# Patient Record
Sex: FEMALE | Race: WHITE | URBAN - METROPOLITAN AREA
[De-identification: names, ages, dates, MRNs, and addresses within clinical notes are randomized per-mention and may not be internally consistent; named-entity substitution may affect disease eponyms.]

---

## 2017-09-29 ENCOUNTER — OFFICE VISIT (OUTPATIENT)
Dept: INTERNAL MEDICINE CLINIC | Age: 22
End: 2017-09-29

## 2017-09-29 VITALS
WEIGHT: 165 LBS | OXYGEN SATURATION: 100 % | HEART RATE: 94 BPM | BODY MASS INDEX: 27.49 KG/M2 | SYSTOLIC BLOOD PRESSURE: 128 MMHG | DIASTOLIC BLOOD PRESSURE: 86 MMHG | TEMPERATURE: 98.2 F | RESPIRATION RATE: 18 BRPM | HEIGHT: 65 IN

## 2017-09-29 DIAGNOSIS — M79.604 PAIN OF RIGHT LOWER EXTREMITY: Primary | ICD-10-CM

## 2017-09-29 NOTE — MR AVS SNAPSHOT
Visit Information Date & Time Provider Department Dept. Phone Encounter #  
 9/29/2017  2:45 PM MD [facility] [phone] [id number] Follow-up Instructions Return in about 2 weeks (around 10/13/2017) for right foot pain. Follow-up and Disposition History Upcoming Health Maintenance Date Due  
 HPV AGE 9Y-34Y (1 of 3 - Female 3 Dose Series) 7/25/2005 DTaP/Tdap/Td series (1 - Tdap) 7/25/2015 PAP AKA CERVICAL CYTOLOGY 7/25/2015 Allergies as of 9/29/2017  Review Complete On: 9/29/2017 By: Kris Candelario MD  
  
 Severity Noted Reaction Type Reactions Latex Low 09/29/2017    Itching Gluten High 09/29/2017    Nausea and Vomiting Penicillins Medium 09/29/2017    Other (comments) Joint pain Current Immunizations  Never Reviewed No immunizations on file. Not reviewed this visit You Were Diagnosed With   
  
 Codes Comments Pain of right lower extremity    -  Primary ICD-10-CM: M79.604 ICD-9-CM: 729.5 Vitals BP Pulse Temp Resp Height(growth percentile) Weight(growth percentile) 128/86 (BP 1 Location: Left arm, BP Patient Position: Sitting) 94 98.2 °F (36.8 °C) (Oral) 18 5' 5\" (1.651 m) 165 lb (74.8 kg) SpO2 BMI  
  
  
  
 100% 27.46 kg/m2 Vitals History BMI and BSA Data Body Mass Index Body Surface Area  
 27.46 kg/m 2 1.85 m 2 Your Updated Medication List  
  
Notice  As of 9/29/2017  3:52 PM  
 You have not been prescribed any medications. We Performed the Following VITAMIN D, 25 HYDROXY [id number] CPT(R)] Follow-up Instructions Return in about 2 weeks (around 10/13/2017) for right foot pain. To-Do List   
 09/29/2017 Imaging:  XR FOOT RT MIN 3 V Introducing [facility] & HEALTH SERVICES!    
 Adelia Chavez introduces [employer] patient portal. Now you can access parts of your medical record, email your doctor's office, and request medication refills online. 1. In your internet browser, go to [email or web address] 2. Click on the First Time User? Click Here link in the Sign In box. You will see the New Member Sign Up page. 3. Enter your TopPatch Access Code exactly as it appears below. You will not need to use this code after youve completed the sign-up process. If you do not sign up before the expiration date, you must request a new code. · TopPatch Access Code: [email or web address] Expires: 12/28/2017  3:51 PM 
 
4. Enter the last four digits of your Social Security Number (xxxx) and Date of Birth (mm/dd/yyyy) as indicated and click Submit. You will be taken to the next sign-up page. 5. Create a TopPatch ID. This will be your TopPatch login ID and cannot be changed, so think of one that is secure and easy to remember. 6. Create a TopPatch password. You can change your password at any time. 7. Enter your Password Reset Question and Answer. This can be used at a later time if you forget your password. 8. Enter your e-mail address. You will receive e-mail notification when new information is available in [address] 9. Click Sign Up. You can now view and download portions of your medical record. 10. Click the Download Summary menu link to download a portable copy of your medical information. If you have questions, please visit the Frequently Asked Questions section of the TopPatch website. Remember, TopPatch is NOT to be used for urgent needs. For medical emergencies, dial 911. Now available from your iPhone and Android! Please provide this summary of care documentation to your next provider. If you have any questions after today's visit, please call [phone].

## 2017-09-29 NOTE — PROGRESS NOTES
Chief Complaint   Patient presents with    Foot Pain     R foot pain x 1 year     she is a 20y.o. year old female who presents for evaluation of R foot pain x 1 year. Pain Assessment Encounter      Whit Devlin  9/29/2017  Onset of Symptoms: 1 year  ________________________________________________________________________  Description: Dull to sharp    Frequency: 3-4 times a day  Pain Scale:(1-10): 3  Trauma Hx: None  Hx of similar symptoms: No:   Radiation: None  Duration:  intermittent      Progression: is unchanged  What makes it better?: rest  What makes it worse?:exercise and walking  Medications tried: ibuprofen    Reviewed and agree with Nurse Note and duplicated in this note. Reviewed PmHx, RxHx, FmHx, SocHx, AllgHx and updated and dated in the chart. No family history on file. No past medical history on file.    Social History     Social History    Marital status: UNKNOWN     Spouse name: N/A    Number of children: N/A    Years of education: N/A     Social History Main Topics    Smoking status: Not on file    Smokeless tobacco: Not on file    Alcohol use Not on file    Drug use: Not on file    Sexual activity: Not on file     Other Topics Concern    Not on file     Social History Narrative        Review of Systems - negative except as listed above      Objective:     Vitals:    09/29/17 1505   BP: 144/83   Pulse: 94   Resp: 18   Temp: 98.2 °F (36.8 °C)   TempSrc: Oral   SpO2: 100%   Weight: 165 lb (74.8 kg)   Height: 5' 5\" (1.651 m)       Physical Examination: General appearance - alert, well appearing, and in no distress  Back exam - full range of motion, no tenderness, palpable spasm or pain on motion  Neurological - alert, oriented, normal speech, no focal findings or movement disorder noted  Musculoskeletal - no joint tenderness, deformity or swelling  Extremities - right foot -pain with palpation of digits 2 and 3 metatarsal heads dorsal surface, no deformity noted, strength 5 out of 5, negative hop test negative Tinel's test  Skin - normal coloration and turgor, no rashes, no suspicious skin lesions noted    Assessment/ Plan:   Diagnoses and all orders for this visit:    1. Pain of right lower extremity  -     XR FOOT RT MIN 3 V; Future         Metatarsalgia versus Gonzalez's neuroma versus stress fracture  We will treat this is a stress fracture with a boot as patient rather have this treatment. Consider physical therapy. Return to clinic in 2 weeks for re-eval  Modify activity levels  X-ray within normal limits    I have discussed the diagnosis with the patient and the intended plan as seen in the above orders. The patient has received an after-visit summary and questions were answered concerning future plans. Medication Side Effects and Warnings were discussed with patient: yes  Patient Labs were reviewed and or requested: yes  Patient Past Records were reviewed and or requested  yes  I have discussed the diagnosis with the patient and the intended plan as seen in the above orders. The patient has received an after-visit summary and questions were answered concerning future plans. Pt agrees to call or return to clinic and/or go to closest ER with any worsening of symptoms. This may include, but not limited to increased fever (>100.4) with NSAIDS or Tylenol, increased edema, confusion, rash, worsening of presenting symptoms. 1) Remember to stay active and/or exercise regularly (I suggest 30-45 minutes daily)   2) For reliable dietary information, go to www. EATRIGHT.org. You may wish to consider seeing the nutritionist at [facility] at #[phone] or [phone], also consider the [address]   3) I routinely suggest a complete physical exam once each year (your birth month)

## 2017-09-30 LAB — 25(OH)D3+25(OH)D2 SERPL-MCNC: 15 NG/ML (ref 30–100)

## 2017-10-04 RX ORDER — ERGOCALCIFEROL 1.25 MG/1
50000 CAPSULE ORAL
Qty: 7 CAP | Refills: 0 | Status: SHIPPED | OUTPATIENT
Start: 2017-10-04 | End: 2017-11-17 | Stop reason: SDUPTHER

## 2017-10-13 ENCOUNTER — OFFICE VISIT (OUTPATIENT)
Dept: INTERNAL MEDICINE CLINIC | Age: 22
End: 2017-10-13

## 2017-10-13 VITALS
HEIGHT: 65 IN | SYSTOLIC BLOOD PRESSURE: 128 MMHG | HEART RATE: 72 BPM | TEMPERATURE: 98 F | DIASTOLIC BLOOD PRESSURE: 86 MMHG

## 2017-10-13 DIAGNOSIS — M84.374S STRESS FRACTURE, RIGHT FOOT, SEQUELA: Primary | ICD-10-CM

## 2017-10-13 NOTE — MR AVS SNAPSHOT
Visit Information Date & Time Provider Department Dept. Phone Encounter #  
 10/13/2017  2:30 PM [address] [facility] MD [facility] [phone] [id number] Follow-up Instructions Return in about 3 weeks (around 11/3/2017). Upcoming Health Maintenance Date Due  
 HPV AGE 9Y-34Y (1 of 3 - Female 3 Dose Series) 7/25/2005 PAP AKA CERVICAL CYTOLOGY 7/25/2015 DTaP/Tdap/Td series (2 - Td) 10/13/2027 Allergies as of 10/13/2017  Review Complete On: 10/13/2017 By: [address] [facility] MD Rafael  
  
 Severity Noted Reaction Type Reactions Latex Low 09/29/2017    Itching Gluten High 09/29/2017    Nausea and Vomiting Penicillins Medium 09/29/2017    Other (comments) Joint pain Current Immunizations  Never Reviewed No immunizations on file. Not reviewed this visit You Were Diagnosed With   
  
 Codes Comments Stress fracture, right foot, sequela    -  Primary ICD-10-CM: B77.361B ICD-9-CM: GMU7579 Vitals BP Pulse Temp Height(growth percentile) 128/86 (BP 1 Location: Right arm, BP Patient Position: Sitting) 72 98 °F (36.7 °C) (Oral) 5' 5\" (1.651 m) Your Updated Medication List  
  
   
This list is accurate as of: 10/13/17  2:48 PM.  Always use your most recent med list.  
  
  
  
  
 ergocalciferol 50,000 unit capsule Commonly known as:  ERGOCALCIFEROL Take 1 Cap by mouth every seven (7) days. Follow-up Instructions Return in about 3 weeks (around 11/3/2017). To-Do List   
 10/13/2017 Imaging:  NM BONE SCAN 3 PHASE Introducing [facility] & HEALTH SERVICES! Apollo Carnes introduces [employer] patient portal. Now you can access parts of your medical record, email your doctor's office, and request medication refills online. 1. In your internet browser, go to [email or web address] 2. Click on the First Time User? Click Here link in the Sign In box.  You will see the New Member Sign Up page. 3. Enter your Pay with a Tweet Access Code exactly as it appears below. You will not need to use this code after youve completed the sign-up process. If you do not sign up before the expiration date, you must request a new code. · Pay with a Tweet Access Code: [email or web address] Expires: 12/28/2017  3:51 PM 
 
4. Enter the last four digits of your Social Security Number (xxxx) and Date of Birth (mm/dd/yyyy) as indicated and click Submit. You will be taken to the next sign-up page. 5. Create a ubigratet ID. This will be your Pay with a Tweet login ID and cannot be changed, so think of one that is secure and easy to remember. 6. Create a Pay with a Tweet password. You can change your password at any time. 7. Enter your Password Reset Question and Answer. This can be used at a later time if you forget your password. 8. Enter your e-mail address. You will receive e-mail notification when new information is available in [address] 9. Click Sign Up. You can now view and download portions of your medical record. 10. Click the Download Summary menu link to download a portable copy of your medical information. If you have questions, please visit the Frequently Asked Questions section of the Pay with a Tweet website. Remember, Pay with a Tweet is NOT to be used for urgent needs. For medical emergencies, dial 911. Now available from your iPhone and Android! Please provide this summary of care documentation to your next provider. Your primary care clinician is listed as Roberto Miller. If you have any questions after today's visit, please call [phone].

## 2017-10-13 NOTE — PROGRESS NOTES
Chief Complaint   Patient presents with    Foot Pain     x 2 week     she is a 20y.o. year old female who presents for follow up of injury. Follow Up Pain Assessment Encounter      Onset of Symptoms: months  ________________________________________________________________________  Description: Pain is now 60% has slightly improved      Pain Scale:(1-10): 2  Duration: constant  What makes it better?: rest  What makes it worse?:walking  Medications tried: ibuprofen  Modalities tried:         Reviewed and agree with Nurse Note and duplicated in this note. Reviewed PmHx, RxHx, FmHx, SocHx, AllgHx and updated and dated in the chart. No family history on file. No past medical history on file.    Social History     Social History    Marital status: SINGLE     Spouse name: N/A    Number of children: N/A    Years of education: N/A     Social History Main Topics    Smoking status: Not on file    Smokeless tobacco: Not on file    Alcohol use Not on file    Drug use: Not on file    Sexual activity: Not on file     Other Topics Concern    Not on file     Social History Narrative        Review of Systems - negative except as listed above      Objective:     Vitals:    10/13/17 1436   BP: 128/86   Pulse: 72   Temp: 98 °F (36.7 °C)   TempSrc: Oral   Height: 5' 5\" (1.651 m)       Physical Examination: General appearance - alert, well appearing, and in no distress  Back exam - full range of motion, no tenderness, palpable spasm or pain on motion  Neurological - alert, oriented, normal speech, no focal findings or movement disorder noted  Musculoskeletal - no joint tenderness, deformity or swelling  Extremities - right foot -pain with palpation of digits 2 and 3 metatarsal heads dorsal surface, no deformity noted, strength 5 out of 5, negative hop test negative Tinel's test    Skin - normal coloration and turgor, no rashes, no suspicious skin lesions noted    Assessment/ Plan:   Diagnoses and all orders for this

## 2017-11-15 ENCOUNTER — OFFICE VISIT (OUTPATIENT)
Dept: INTERNAL MEDICINE CLINIC | Age: 22
End: 2017-11-15

## 2017-11-15 VITALS
OXYGEN SATURATION: 98 % | WEIGHT: 168.2 LBS | DIASTOLIC BLOOD PRESSURE: 97 MMHG | SYSTOLIC BLOOD PRESSURE: 139 MMHG | TEMPERATURE: 98.4 F | HEART RATE: 91 BPM | HEIGHT: 65 IN | RESPIRATION RATE: 16 BRPM | BODY MASS INDEX: 28.02 KG/M2

## 2017-11-15 DIAGNOSIS — E55.9 HYPOVITAMINOSIS D: ICD-10-CM

## 2017-11-15 DIAGNOSIS — M79.604 PAIN OF RIGHT LOWER EXTREMITY: Primary | ICD-10-CM

## 2017-11-15 NOTE — MR AVS SNAPSHOT
Visit Information Date & Time Provider Department Dept. Phone Encounter #  
 11/15/2017  4:30 PM [address] [facility] MD [facility] [phone] [id number] Follow-up Instructions Return in about 2 weeks (around 11/29/2017) for foot pain. Follow-up and Disposition History Upcoming Health Maintenance Date Due  
 HPV AGE 9Y-34Y (1 of 3 - Female 3 Dose Series) 7/25/2005 PAP AKA CERVICAL CYTOLOGY 7/25/2015 DTaP/Tdap/Td series (2 - Td) 10/13/2027 Allergies as of 11/15/2017  Review Complete On: 11/15/2017 By: [address] [facility] MD Rafael  
  
 Severity Noted Reaction Type Reactions Latex Low 09/29/2017    Itching Gluten High 09/29/2017    Nausea and Vomiting Penicillins Medium 09/29/2017    Other (comments) Joint pain Current Immunizations  Never Reviewed No immunizations on file. Not reviewed this visit You Were Diagnosed With   
  
 Codes Comments Pain of right lower extremity    -  Primary ICD-10-CM: M79.604 ICD-9-CM: 729.5 Hypovitaminosis D     ICD-10-CM: E55.9 ICD-9-CM: 268.9 Vitals BP Pulse Temp Resp Height(growth percentile) Weight(growth percentile) (!) 139/97 (BP 1 Location: Left arm, BP Patient Position: Sitting) 91 98.4 °F (36.9 °C) (Oral) 16 5' 5\" (1.651 m) 168 lb 3.2 oz (76.3 kg) LMP SpO2 BMI OB Status 11/01/2017 (Within Days) 98% 27.99 kg/m2 Having regular periods Vitals History BMI and BSA Data Body Mass Index Body Surface Area  
 27.99 kg/m 2 1.87 m 2 Your Updated Medication List  
  
   
This list is accurate as of: 11/15/17  4:55 PM.  Always use your most recent med list.  
  
  
  
  
 ergocalciferol 50,000 unit capsule Commonly known as:  ERGOCALCIFEROL Take 1 Cap by mouth every seven (7) days. We Performed the Following VITAMIN D, 25 HYDROXY [id number] CPT(R)] Follow-up Instructions Return in about 2 weeks (around 11/29/2017) for foot pain. To-Do List   
 11/15/2017 Imaging:  NM BONE SCAN 3 PHASE Introducing [facility] & HEALTH SERVICES! Dante Ogden introduces [employer] patient portal. Now you can access parts of your medical record, email your doctor's office, and request medication refills online. 1. In your internet browser, go to [email or web address] 2. Click on the First Time User? Click Here link in the Sign In box. You will see the New Member Sign Up page. 3. Enter your [employer] Access Code exactly as it appears below. You will not need to use this code after youve completed the sign-up process. If you do not sign up before the expiration date, you must request a new code. · [employer] Access Code: [email or web address] Expires: 12/28/2017  2:51 PM 
 
4. Enter the last four digits of your Social Security Number (xxxx) and Date of Birth (mm/dd/yyyy) as indicated and click Submit. You will be taken to the next sign-up page. 5. Create a MarkTheGlobe ID. This will be your [employer] login ID and cannot be changed, so think of one that is secure and easy to remember. 6. Create a [employer] password. You can change your password at any time. 7. Enter your Password Reset Question and Answer. This can be used at a later time if you forget your password. 8. Enter your e-mail address. You will receive e-mail notification when new information is available in [address] 9. Click Sign Up. You can now view and download portions of your medical record. 10. Click the Download Summary menu link to download a portable copy of your medical information. If you have questions, please visit the Frequently Asked Questions section of the [employer] website. Remember, [employer] is NOT to be used for urgent needs. For medical emergencies, dial 911. Now available from your iPhone and Android! Please provide this summary of care documentation to your next provider. Your primary care clinician is listed as Nancy Castanon. If you have any questions after today's visit, please call [phone].

## 2017-11-15 NOTE — PROGRESS NOTES
Chief Complaint   Patient presents with    Foot Pain     she is a 20y.o. year old female who presents for follow up of injury. Follow Up Pain Assessment Encounter      Onset of Symptoms: Several Months  ________________________________________________________________________  Description: Pain has improved      Pain Scale:(1-10): 0  Duration:  continuous  What makes it better?: None  What makes it worse?:None  Medications tried: None  Modalities tried: None        Reviewed and agree with Nurse Note and duplicated in this note. Reviewed PmHx, RxHx, FmHx, SocHx, AllgHx and updated and dated in the chart. No family history on file. No past medical history on file.    Social History     Social History    Marital status: SINGLE     Spouse name: N/A    Number of children: N/A    Years of education: N/A     Social History Main Topics    Smoking status: Not on file    Smokeless tobacco: Not on file    Alcohol use Not on file    Drug use: Not on file    Sexual activity: Not on file     Other Topics Concern    Not on file     Social History Narrative        Review of Systems - negative except as listed above      Objective:     Vitals:    11/15/17 1627   BP: (!) 139/97   Pulse: 91   Resp: 16   Temp: 98.4 °F (36.9 °C)   TempSrc: Oral   SpO2: 98%   Weight: 168 lb 3.2 oz (76.3 kg)   Height: 5' 5\" (1.651 m)       Physical Examination: General appearance - alert, well appearing, and in no distress  Back exam - full range of motion, no tenderness, palpable spasm or pain on motion  Neurological - alert, oriented, normal speech, no focal findings or movement disorder noted  Musculoskeletal -  right foot -pain with palpation of digits 2 and 3 metatarsal heads dorsal surface, no deformity noted, strength 5 out of 5, negative hop test negative Tinel's test  Extremities - peripheral pulses normal, no pedal edema, no clubbing or cyanosis  Skin - normal coloration and turgor, no rashes, no suspicious skin lesions noted    Assessment/ Plan:   Diagnoses and all orders for this visit:    1. Pain of right lower extremity  -     NM BONE SCAN 3 PHASE; Future    2. Hypovitaminosis D  -     VITAMIN D, 25 HYDROXY      Stress reaction vs mortons neuroma  Met pads given to patient   Consider steroid injection if bone scan normal.      Follow-up Disposition: Not on File    Pathophysiology, recovery and rehabilitation process discussed and questions answered   Counseling for 30 Minutes of the total visit duration   Pictures and figures used as necessary   Provided reassurance   Monitor response to Physical Therapy   Recommend  lower impact activities-walking, Eliptical, Nordic Track, cycling or swimming   Follow up in 4 week(s)      1) Remember to stay active and/or exercise regularly (I suggest 30-45 minutes daily)   2) For reliable dietary information, go to www. EATRIGHT.org. You may wish to consider seeing the nutritionist at [facility] [phone], also consider the [address] I have discussed the diagnosis with the patient and the intended plan as seen in the above orders. The patient has received an after-visit summary and questions were answered concerning future plans. Medication Side Effects and Warnings were discussed with patient: yes  Patient Labs were reviewed and or requested: yes  Patient Past Records were reviewed and or requested  yes  I have discussed the diagnosis with the patient and the intended plan as seen in the above orders. The patient has received an after-visit summary and questions were answered concerning future plans. Pt agrees to call or return to clinic and/or go to closest ER with any worsening of symptoms. This may include, but not limited to increased fever (>100.4) with NSAIDS or Tylenol, increased edema, confusion, rash, worsening of presenting symptoms.

## 2017-11-17 LAB — 25(OH)D3+25(OH)D2 SERPL-MCNC: 28.5 NG/ML (ref 30–100)

## 2017-11-17 RX ORDER — ERGOCALCIFEROL 1.25 MG/1
50000 CAPSULE ORAL
Qty: 7 CAP | Refills: 0 | Status: SHIPPED | OUTPATIENT
Start: 2017-11-17

## 2017-12-01 ENCOUNTER — OFFICE VISIT (OUTPATIENT)
Dept: INTERNAL MEDICINE CLINIC | Age: 22
End: 2017-12-01

## 2017-12-01 VITALS
WEIGHT: 166.7 LBS | SYSTOLIC BLOOD PRESSURE: 136 MMHG | RESPIRATION RATE: 16 BRPM | HEIGHT: 65 IN | TEMPERATURE: 98.4 F | BODY MASS INDEX: 27.77 KG/M2 | OXYGEN SATURATION: 99 % | HEART RATE: 88 BPM | DIASTOLIC BLOOD PRESSURE: 82 MMHG

## 2017-12-01 DIAGNOSIS — G57.61 MORTON'S NEUROMA OF RIGHT FOOT: Primary | ICD-10-CM

## 2017-12-01 NOTE — PROGRESS NOTES
Chief Complaint   Patient presents with    Foot Pain     she is a 20y.o. year old female who presents for follow up of injury. Follow Up Pain Assessment Encounter      Onset of Symptoms: Several Months  ________________________________________________________________________  Description: Pain has improved      Pain Scale:(1-10): 5  Duration:  intermittent  What makes it better?: rest  What makes it worse?:walking  Medications tried: None  Modalities tried: None        Reviewed and agree with Nurse Note and duplicated in this note. Reviewed PmHx, RxHx, FmHx, SocHx, AllgHx and updated and dated in the chart. No family history on file. No past medical history on file.    Social History     Social History    Marital status: SINGLE     Spouse name: N/A    Number of children: N/A    Years of education: N/A     Social History Main Topics    Smoking status: Not on file    Smokeless tobacco: Not on file    Alcohol use Not on file    Drug use: Not on file    Sexual activity: Not on file     Other Topics Concern    Not on file     Social History Narrative        Review of Systems - negative except as listed above      Objective:     Vitals:    12/01/17 1627   BP: 136/82   Pulse: 88   Resp: 16   Temp: 98.4 °F (36.9 °C)   TempSrc: Oral   SpO2: 99%   Weight: 166 lb 11.2 oz (75.6 kg)   Height: 5' 5\" (1.651 m)       Physical Examination:  General appearance - alert, well appearing, and in no distress  Back exam - full range of motion, no tenderness, palpable spasm or pain on motion  Neurological - alert, oriented, normal speech, no focal findings or movement disorder noted  Musculoskeletal -  right foot -pain with palpation of digits 2 and 3 metatarsal heads dorsal surface, no deformity noted, strength 5 out of 5, negative hop test negative Tinel's test  Extremities - peripheral pulses normal, no pedal edema, no clubbing or cyanosis  Skin - normal coloration and turgor, no rashes, no suspicious skin lesions noted    Assessment/ Plan:   Diagnoses and all orders for this visit:    1. Carlos's neuroma of right foot  rtc for injection after bone scan next week   Follow-up Disposition: Not on File    Pathophysiology, recovery and rehabilitation process discussed and questions answered   Counseling for 30 Minutes of the total visit duration   Pictures and figures used as necessary   Provided reassurance   Monitor response to Physical Therapy   Recommend activity modification   Recommend  lower impact activities-walking, Eliptical, Nordic Track, cycling or swimming   Follow up in 4 week(s)          1) Remember to stay active and/or exercise regularly (I suggest 30-45 minutes daily)   2) For reliable dietary information, go to www. [employer].org. You may wish to consider seeing the nutritionist at [facility] [phone], also consider the [id number] I have discussed the diagnosis with the patient and the intended plan as seen in the above orders. The patient has received an after-visit summary and questions were answered concerning future plans. Medication Side Effects and Warnings were discussed with patient: yes  Patient Labs were reviewed and or requested: yes  Patient Past Records were reviewed and or requested  yes  I have discussed the diagnosis with the patient and the intended plan as seen in the above orders. The patient has received an after-visit summary and questions were answered concerning future plans. Pt agrees to call or return to clinic and/or go to closest ER with any worsening of symptoms. This may include, but not limited to increased fever (>100.4) with NSAIDS or Tylenol, increased edema, confusion, rash, worsening of presenting symptoms.